# Patient Record
Sex: MALE | Employment: OTHER | URBAN - METROPOLITAN AREA
[De-identification: names, ages, dates, MRNs, and addresses within clinical notes are randomized per-mention and may not be internally consistent; named-entity substitution may affect disease eponyms.]

---

## 2020-08-28 ENCOUNTER — ANCILLARY PROCEDURE (OUTPATIENT)
Dept: GENERAL RADIOLOGY | Facility: CLINIC | Age: 45
End: 2020-08-28
Attending: NURSE PRACTITIONER
Payer: COMMERCIAL

## 2020-08-28 ENCOUNTER — OFFICE VISIT (OUTPATIENT)
Dept: FAMILY MEDICINE | Facility: CLINIC | Age: 45
End: 2020-08-28
Payer: COMMERCIAL

## 2020-08-28 VITALS
BODY MASS INDEX: 26.88 KG/M2 | WEIGHT: 192 LBS | SYSTOLIC BLOOD PRESSURE: 137 MMHG | HEART RATE: 72 BPM | HEIGHT: 71 IN | DIASTOLIC BLOOD PRESSURE: 88 MMHG | TEMPERATURE: 98.1 F | OXYGEN SATURATION: 99 %

## 2020-08-28 DIAGNOSIS — S49.92XA SHOULDER INJURY, LEFT, INITIAL ENCOUNTER: ICD-10-CM

## 2020-08-28 DIAGNOSIS — S49.92XA SHOULDER INJURY, LEFT, INITIAL ENCOUNTER: Primary | ICD-10-CM

## 2020-08-28 PROCEDURE — 73030 X-RAY EXAM OF SHOULDER: CPT | Mod: LT

## 2020-08-28 PROCEDURE — 99203 OFFICE O/P NEW LOW 30 MIN: CPT | Performed by: NURSE PRACTITIONER

## 2020-08-28 RX ORDER — CYCLOBENZAPRINE HCL 10 MG
TABLET ORAL
Qty: 20 TABLET | Refills: 0 | Status: SHIPPED | OUTPATIENT
Start: 2020-08-28

## 2020-08-28 RX ORDER — NAPROXEN 500 MG/1
500 TABLET ORAL 2 TIMES DAILY WITH MEALS
Qty: 14 TABLET | Refills: 0 | Status: SHIPPED | OUTPATIENT
Start: 2020-08-28 | End: 2020-09-04

## 2020-08-28 ASSESSMENT — MIFFLIN-ST. JEOR: SCORE: 1768.04

## 2020-08-28 NOTE — PROGRESS NOTES
"Subjective   Nigel Pro is a 45 year old male who presents to clinic today for the following health issues:    HPI   Musculoskeletal problem/pain  Onset/Duration: 07/03/2020 -   Description  Location: Shoulder - left  Joint Swelling: no  Redness: no  Pain: YES - discomfort when sleeping  Warmth: no  Intensity:  mild, moderate  Progression of Symptoms:  same  Accompanying signs and symptoms:   Fevers: no  Numbness/tingling/weakness: Did stop lifting weight due to the pain - 3-4x per week  History  Trauma to the area: Remembers day it stared hurting - boat was twisted around - had to jump off and move it July 3rd.   Recent illness:  no  Previous similar problem: YES- [id number] injured wake boarding  Previous evaluation:  YES- for injury above had an MRI and PT - did get completely better  Precipitating or alleviating factors:  Aggravating factors include: exercise and overuse  Therapies tried and outcome: heat, ice when first started hurting and NSAID - ibuprofen at bedtime 2 tablets      Recalls MRI revealed slight labrum tear 2007 or 2008.     Reviewed and updated as needed this visit by provider:  Tobacco  Allergies  Meds  Problems  Med Hx  Surg Hx  Fam Hx         Review of Systems   Constitutional, HEENT, cardiovascular, pulmonary, GI, , musculoskeletal, neuro, skin, endocrine and psych systems are negative, except as otherwise noted in the HPI.          Objective   /88 (BP Location: Right arm, Patient Position: Chair, Cuff Size: Adult Large)   Pulse 72   Temp 98.1  F (36.7  C) (Tympanic)   Ht 1.803 m (5' 11\")   Wt 87.1 kg (192 lb)   SpO2 99%   BMI 26.78 kg/m   Body mass index is 26.78 kg/m .  Physical Exam   GENERAL: healthy, alert, well nourished, well hydrated, no distress  RESP: lungs clear to auscultation - no rales, no rhonchi, no wheezes  CV: regular rates and rhythm, normal S1 S2, no S3 or S4 and no murmur, no click or rub -  ABDOMEN: soft, no tenderness, no  hepatosplenomegaly, no " masses, normal bowel sounds  SHOULDER Exam-Left   Inspection: no swelling, no bruising, no discoloration, no obvious deformity, no asymmetry, no glenohumeral joint anterior bulge, no distal clavicle elevation, no muscle atrophy, no scapular winging   Tenderness of: SC joint- no, clavicle(prox-mid)- no, clavicle-(mid-distal)- no, AC joint- no, acromion- no, anterior capsule- no, prox bicep tendon- no, greater tuberosity- no, prox humerus- no, supraspinatous- no, infraspinatous- YES, superior trapezious- no, rhomboids- no   Range of Motion: Active- forward flexion- full but painful at about 135 degrees, abduction- painful at about 90 degress , external rotation- normal, internal rotation- normal    Strength: forward flexion- 5/5, abduction- 5/5, internal rotation- 5/5, external rotation- 5/5 and bicep- full      SHOULDER Exam-Right WNL                       SKIN: no suspicious lesions, no rashes    Diagnostic Test Results  Xray - unremarkable awaiting official radiology read      Assessment & Plan   Nigel was seen today for new patient and musculoskeletal problem.    Diagnoses and all orders for this visit:    Shoulder injury, left, initial encounter  Pain X 7 weeks not improved back to his baseline.   Previous left shoulder injury.   Muscle relaxer at bedtime; naproxen BID.  Referral to shoulder specialist.  Red flag symptoms discussed and if these occur present to the emergency room or call 911.  Nigel verbalizes understanding of plan of care and is in agreement.    -     XR Shoulder Left G/E 3 Views; Future  -     cyclobenzaprine (FLEXERIL) 10 MG tablet; 1/2 tab during the day and 1 tab at bedtime as needed for muscle spasms  -     naproxen (NAPROSYN) 500 MG tablet; Take 1 tablet (500 mg) by mouth 2 times daily (with meals) for 7 days  -     Orthopedic & Spine  Referral; Future    See Patient Instructions    Return in about 2 weeks (around 9/11/2020), or if symptoms worsen or fail to improve.     Melissa  Burke FNP-BC     [address] [address]  [email or web address]   Office: [phone]

## 2020-08-28 NOTE — PATIENT INSTRUCTIONS
Patient Education     Shoulder Pain with Uncertain Cause  Shoulder pain can have many causes. Pain often comes from the structures that surround the shoulder joint. These are the joint capsule, ligaments, tendons, muscles, and bursa. Pain can also come from cartilage in the joint. Cartilage can become worn out or injured. It s important to know what s causing your pain so the healthcare provider can use the correct treatment. But sometimes it s difficult to find the exact cause of shoulder pain. You may need to see a specialist (orthopedist). You may also need special tests such as a CT scan or MRI. The provider may need to use special tools to look inside the joint (arthroscopy).  Shoulder pain can be treated with a sling or a device that keeps your shoulder from moving. You can take an anti-inflammatory medicine such as ibuprofen to ease pain. You may need to do special shoulder exercises. Follow up with a specialist if the pain is severe or doesn t go away after a few weeks.  Home care  Follow these tips when caring for yourself at home:    If a sling was given to you, leave it in place for the time advised by your healthcare provider. If you aren t sure how long to wear it, ask for advice. If the sling becomes loose, adjust it so that your forearm is level with the ground. Your shoulder should feel well supported.    Put an ice pack on the injured area for 20 minutes every 1 to 2 hours the first day. You can make your own ice pack by putting ice cubes in a plastic bag. Wrap the bag in a thin towel. Continue with ice packs 3 to 4 times a day for the next 2 days. Then use the pack as needed to ease pain and swelling.    You may use acetaminophen or ibuprofen to control pain, unless another pain medicine was prescribed. If you have chronic liver or kidney disease, talk with your healthcare provider before using these medicines. Also talk with your provider if you ve ever had a stomach ulcer or GI  bleeding.    Shoulder pain may seem worse at night, when there is less to distract you from the pain. If you sleep on your side, try to keep weight off your painful shoulder. Propping pillows behind you may stop you from rolling over onto that shoulder during sleep.     Shoulder and elbow joints can become stiff if left in a sling for too long. You should start range of motion exercises about 7 to 10 days after the injury. Talk with your provider to find out what type of exercises to do and how soon to start.    You can take the sling off to shower or bathe.  Follow-up care  Follow up with your healthcare provider if you don t start to get better in the next 5 days.  When to seek medical advice  Call your healthcare provider right away if any of these occur:    Pain or swelling gets worse or continues for more than a few days    Your hand or fingers become cold, blue, numb, or tingly    Large amount of bruising on your shoulder or upper arm    Difficulty moving your hand or fingers    Weakness in your hand or fingers    Your shoulder becomes stiff    It feels like your shoulder is popping out    You are less able to do your daily activities  Date Last Reviewed: 10/1/2016    0999-1413 [employer]. [address] [place] [address], [place] [address]. All rights reserved. This information is not intended as a substitute for professional medical care. Always follow your healthcare professional's instructions.

## 2020-08-28 NOTE — LETTER
[facility]  [address] [place] [address]  [phone]                    August 31, 2020    Nigel Pro  [address]  [place] [address]      Dear Nigel,    Here is a summary of your recent test results:    Nigel,     It was nice to meet you.       Your shoulder xray was normal. Please see shoulder specialist as discussed to evaluate your shoulder pain further.       VERONICA Huston     For additional lab test information, [email or web address] is an excellent reference.     Your test results are enclosed.      Please contact me if you have any questions.    In addition, here is a list of due or overdue Health Maintenance reminders.    Health Maintenance Due   Topic Date Due     Colorectal Cancer Screening  07/24/1983     HIV Screening  07/24/1988     Preventive Care Visit  09/14/2017     Cholesterol Lab  09/14/2017     Prostate Test  09/14/2017     PHQ-2  01/01/2020     Flu Vaccine (1) 09/01/2020       Please call us at [phone] (or use [employer]) to address the above recommendations.            Thank you very much for trusting [facility]..     Healthy regards,      VERONICA Huston        Results for orders placed or performed in visit on 08/28/20   XR Shoulder Left G/E 3 Views     Status: None    Narrative    XR SHOULDER LT G/E 3 VW   8/28/2020 4:00 PM     HISTORY:  Shoulder injury, left, initial encounter      Impression    IMPRESSION:  Negative exam.    JUVENTINO TALAMANTES MD

## 2020-08-30 NOTE — RESULT ENCOUNTER NOTE
Note to Staff: please send a result letter    Nigel,    It was nice to meet you.     Your shoulder xray was normal. Please see shoulder specialist as discussed to evaluate your shoulder pain further.       NATALIYA Huston-BC    For additional lab test information, [email or web address] is an excellent reference.

## 2020-09-01 ENCOUNTER — OFFICE VISIT (OUTPATIENT)
Dept: ORTHOPEDICS | Facility: CLINIC | Age: 45
End: 2020-09-01
Attending: NURSE PRACTITIONER
Payer: COMMERCIAL

## 2020-09-01 ENCOUNTER — ANCILLARY PROCEDURE (OUTPATIENT)
Dept: GENERAL RADIOLOGY | Facility: CLINIC | Age: 45
End: 2020-09-01
Attending: ORTHOPAEDIC SURGERY
Payer: COMMERCIAL

## 2020-09-01 VITALS
BODY MASS INDEX: 26.88 KG/M2 | WEIGHT: 192 LBS | HEIGHT: 71 IN | DIASTOLIC BLOOD PRESSURE: 98 MMHG | SYSTOLIC BLOOD PRESSURE: 140 MMHG

## 2020-09-01 DIAGNOSIS — S43.432A SUPERIOR LABRUM ANTERIOR-TO-POSTERIOR (SLAP) TEAR OF LEFT SHOULDER: Primary | ICD-10-CM

## 2020-09-01 DIAGNOSIS — S49.92XA SHOULDER INJURY, LEFT, INITIAL ENCOUNTER: ICD-10-CM

## 2020-09-01 PROCEDURE — 73030 X-RAY EXAM OF SHOULDER: CPT | Mod: LT

## 2020-09-01 PROCEDURE — 99204 OFFICE O/P NEW MOD 45 MIN: CPT | Performed by: ORTHOPAEDIC SURGERY

## 2020-09-01 ASSESSMENT — MIFFLIN-ST. JEOR: SCORE: 1768.04

## 2020-09-01 NOTE — PATIENT INSTRUCTIONS
1. Superior labrum anterior-to-posterior (SLAP) tear of left shoulder    2. Shoulder injury, left, initial encounter      3T MRI ordered   [place] [place] [phone]    Follow up with Dr. Jurado once MRI is complete    Call my office with any questions or concerns, [phone].

## 2020-09-01 NOTE — LETTER
"    9/1/2020         RE: Nigel Pro  [address]  [place] [address]        Dear Colleague,    Thank you for referring your patient, Nigel Pro, to the [facility] ORTHOPEDIC SURGERY. Please see a copy of my visit note below.    CHIEF COMPLAINT: Left shoulder pain    DIAGNOSIS: Left shoulder pain concerning for posterosuperior labral tear, mild scapular dyskinesis    OCCUPATION/SPORT: , wake surfing    HPI:   Nigel Pro is a 45 year old, right-hand dominant male who presents for evaluation of left shoulder pain. Symptoms started on the 3rd of July. There was a precipitating event where he was pushing his boat off the dock and noted discomfort and soreness in his left posterior shoulder. The pain is located to the left posterosuperior shoulder. Worst pain is rated a 5 of 10, and current pain is rated at 1 of 10. He thought that the symptoms would go away on his own. Symptoms are worsened by bench press, taking of his shirt - crossing his arms at his waist and coming up, sleeping. The pain in his shoulder does wake him from sleep at night. Symptoms are improved with activity avoidance, rest, Naproxen and muscle relaxers. Patient has tried heat and ice with minimal relief. Associated symptoms include sharp pain, popping and catching. Notably, the patient has had a previous injury about 15 years ago while wakeboarding, had an MRI and showed he had a \"slight\" labral tear that was treated with four sessions of PT and it went away on its own. He didn't have any problems until the July 3rd incident No other concerns or complaints at this time.    PAST MEDICAL HISTORY:  1. None    CURRENT MEDICATIONS:  1. Flexeril PRN  2. Naproxen PRN    ALLERGIES:   NKDA    PAST SURGICAL HISTORY:  1. None    FAMILY HISTORY: No known family history of bleeding, clotting, or anesthesia related complications.    SOCIAL HISTORY: Patient is  and lives at home with his wife and two children. He works " "as an  which is sedentary work. He enjoys wakesurfing and riding bicycle. No other sports or dedicated athletics.    TOXIC HABITS:  I spoke with Nigel today regarding tobacco use and they informed me that they do not use any tobacco products.    REVIEW OF SYSTEMS:  General: Denies fevers, chills, or night sweats.  Skin: Denies rashes or lesions.  Head: Denies headache or dizziness.  Eyes: Denies vision changes or eye pain.  Ears: Denies ear pain or decreased hearing.  Nose: Denies nose bleeding or sinus pain.  Mouth & Throat: Denies bleeding gums or sore throat.  Neck: Denies neck pain or stiffness.  Respiratory: Denies cough, wheezing, sob, or hemoptysis.  Cardiovascular: Denies chest pain, chest pressure, or palpitations.   Gastrointestinal: Denies abdominal pain, nausea, vomiting, diarrhea, or constipation.  Genitourinary: Denies difficulty or pain with urination.   Musculoskeletal: As noted above in the HPI, otherwise normal.  Neuro: Denies paralysis, weakness, paresthesias, or speech difficulty.  Lymphatics: Denies lymphadenopathy.  Psych: Denies anxiety, sadness, or irritability.    PHYSICAL EXAM:  Patient is 5' 11\" and weighs 192 lbs 0 oz. Ht 1.803 m (5' 11\")   Wt 87.1 kg (192 lb)   BMI 26.78 kg/m    Constitutional: Well-developed, well-nourished, healthy appearing male.  Skin: Warm, dry, and without rashes.   HEENT: Normocephalic, atraumatic. Extraocular movements intact. Moist mucous membranes.  Cardiac: Well perfused extremities, strong 2+ peripheral pulses. No edema.   Pulmonary: Non-labored respirations on room air without audible wheezes.   Abdomen: Soft, nontender.  Musculoskeletal: Patient ambulates with a slow, symmetric, and steady gait. Active range of motion of the left shoulder is 170/85/60/T8 compared to 170/85/60/T8 on the right. Left shoulder has a positive active compression test, modified dynamic labral shear test, Katie and jerk test.  All of these were negative on the right.  " Supine exam demonstrates 1+ anterior load and shift and 1+ posterior load and shift.  1+ sulcus that reduces with external rotation.  When viewed from the back he does demonstrate mild left shoulder scapular dyskinesis.  He has full symmetric cuff strength, but mild deconditioning weakness of external rotation strength at the side. No AC, SC, biceps signs, cross-body adduction, Neer, Concepcion, Paul, scars, atrophy, deformity, belly-press, lift-off, external rotation lag sign, internal rotation lag sign, hornblower's bilaterally. No generalized ligamentous laxity. Neurovascular exam and cervical spine exam are normal.    IMAGING:   Left shoulder 4 view radiographs taken today were personally reviewed by me and demonstrate no acute osseous abnormalities and a normal glenohumeral joint without significant arthrosis, retroversion, or signs of fracture or dislocation.    IMPRESSION: 45 year old-year-old right hand dominant male with left shoulder pain and exam findings concerning for a posterosuperior labral tear.     PLAN:   I had a long discussion with Nigel today.  At the present time his history and exam is most consistent and concerning for a significant posterior superior labral tear.  He also has an element of scapular dyskinesis.  Both of these entities can be addressed with a course of nonoperative management, but given the length of his symptoms and severity with his frequent night pain, I do recommend obtaining an MRI scan of the left shoulder.  I do not recommend an arthrogram, instead I would prefer a left shoulder 3 Henrietta MRI scan to carefully evaluate the posterior and superior labrum.  This MRI was ordered.  We will schedule it at his earliest convenience.    In the meantime, I would like him to start working on some basic thoracic extension exercises and lower trapezius strengthening exercises that I demonstrated for him and explained to him.  He should try and perform these exercises at least once every  other day.  He should treat his symptoms with activity modification, relative rest, icing, and nonsteroidal anti-inflammatory drugs.    Return to clinic following the left shoulder MRI for discussion of next steps.  He may be a candidate for an intra-articular corticosteroid injection followed by physical therapy depending on what the MRI shows.    At the conclusion of the office visit, Nigel verbally acknowledged that I answered all of his questions satisfactorily.    Again, thank you for allowing me to participate in the care of your patient.        Sincerely,        Anjel Jurado MD

## 2020-09-01 NOTE — PROGRESS NOTES
"CHIEF COMPLAINT: Left shoulder pain    DIAGNOSIS: Left shoulder pain concerning for posterosuperior labral tear, mild scapular dyskinesis    OCCUPATION/SPORT: , wake surfing    HPI:   Nigel Pro is a 45 year old, right-hand dominant male who presents for evaluation of left shoulder pain. Symptoms started on the 3rd of July. There was a precipitating event where he was pushing his boat off the dock and noted discomfort and soreness in his left posterior shoulder. The pain is located to the left posterosuperior shoulder. Worst pain is rated a 5 of 10, and current pain is rated at 1 of 10. He thought that the symptoms would go away on his own. Symptoms are worsened by bench press, taking of his shirt - crossing his arms at his waist and coming up, sleeping. The pain in his shoulder does wake him from sleep at night. Symptoms are improved with activity avoidance, rest, Naproxen and muscle relaxers. Patient has tried heat and ice with minimal relief. Associated symptoms include sharp pain, popping and catching. Notably, the patient has had a previous injury about 15 years ago while wakeboarding, had an MRI and showed he had a \"slight\" labral tear that was treated with four sessions of PT and it went away on its own. He didn't have any problems until the July 3rd incident No other concerns or complaints at this time.    PAST MEDICAL HISTORY:  1. None    CURRENT MEDICATIONS:  1. Flexeril PRN  2. Naproxen PRN    ALLERGIES:   NKDA    PAST SURGICAL HISTORY:  1. None    FAMILY HISTORY: No known family history of bleeding, clotting, or anesthesia related complications.    SOCIAL HISTORY: Patient is  and lives at home with his wife and two children. He works as an  which is sedentary work. He enjoys wakesurfing and riding bicycle. No other sports or dedicated athletics.    TOXIC HABITS:  I spoke with Nigel today regarding tobacco use and they informed me that they do not use any tobacco " "products.    REVIEW OF SYSTEMS:  General: Denies fevers, chills, or night sweats.  Skin: Denies rashes or lesions.  Head: Denies headache or dizziness.  Eyes: Denies vision changes or eye pain.  Ears: Denies ear pain or decreased hearing.  Nose: Denies nose bleeding or sinus pain.  Mouth & Throat: Denies bleeding gums or sore throat.  Neck: Denies neck pain or stiffness.  Respiratory: Denies cough, wheezing, sob, or hemoptysis.  Cardiovascular: Denies chest pain, chest pressure, or palpitations.   Gastrointestinal: Denies abdominal pain, nausea, vomiting, diarrhea, or constipation.  Genitourinary: Denies difficulty or pain with urination.   Musculoskeletal: As noted above in the HPI, otherwise normal.  Neuro: Denies paralysis, weakness, paresthesias, or speech difficulty.  Lymphatics: Denies lymphadenopathy.  Psych: Denies anxiety, sadness, or irritability.    PHYSICAL EXAM:  Patient is 5' 11\" and weighs 192 lbs 0 oz. Ht 1.803 m (5' 11\")   Wt 87.1 kg (192 lb)   BMI 26.78 kg/m    Constitutional: Well-developed, well-nourished, healthy appearing male.  Skin: Warm, dry, and without rashes.   HEENT: Normocephalic, atraumatic. Extraocular movements intact. Moist mucous membranes.  Cardiac: Well perfused extremities, strong 2+ peripheral pulses. No edema.   Pulmonary: Non-labored respirations on room air without audible wheezes.   Abdomen: Soft, nontender.  Musculoskeletal: Patient ambulates with a slow, symmetric, and steady gait. Active range of motion of the left shoulder is 170/85/60/T8 compared to 170/85/60/T8 on the right. Left shoulder has a positive active compression test, modified dynamic labral shear test, Katie and jerk test.  All of these were negative on the right.  Supine exam demonstrates 1+ anterior load and shift and 1+ posterior load and shift.  1+ sulcus that reduces with external rotation.  When viewed from the back he does demonstrate mild left shoulder scapular dyskinesis.  He has full symmetric " cuff strength, but mild deconditioning weakness of external rotation strength at the side. No AC, SC, biceps signs, cross-body adduction, Neer, Concepcion, Paul, scars, atrophy, deformity, belly-press, lift-off, external rotation lag sign, internal rotation lag sign, hornblower's bilaterally. No generalized ligamentous laxity. Neurovascular exam and cervical spine exam are normal.    IMAGING:   Left shoulder 4 view radiographs taken today were personally reviewed by me and demonstrate no acute osseous abnormalities and a normal glenohumeral joint without significant arthrosis, retroversion, or signs of fracture or dislocation.    IMPRESSION: 45 year old-year-old right hand dominant male with left shoulder pain and exam findings concerning for a posterosuperior labral tear.     PLAN:   I had a long discussion with Nigel today.  At the present time his history and exam is most consistent and concerning for a significant posterior superior labral tear.  He also has an element of scapular dyskinesis.  Both of these entities can be addressed with a course of nonoperative management, but given the length of his symptoms and severity with his frequent night pain, I do recommend obtaining an MRI scan of the left shoulder.  I do not recommend an arthrogram, instead I would prefer a left shoulder 3 Henrietta MRI scan to carefully evaluate the posterior and superior labrum.  This MRI was ordered.  We will schedule it at his earliest convenience.    In the meantime, I would like him to start working on some basic thoracic extension exercises and lower trapezius strengthening exercises that I demonstrated for him and explained to him.  He should try and perform these exercises at least once every other day.  He should treat his symptoms with activity modification, relative rest, icing, and nonsteroidal anti-inflammatory drugs.    Return to clinic following the left shoulder MRI for discussion of next steps.  He may be a candidate for  an intra-articular corticosteroid injection followed by physical therapy depending on what the MRI shows.    At the conclusion of the office visit, Nigel verbally acknowledged that I answered all of his questions satisfactorily.

## 2020-09-03 ENCOUNTER — TRANSFERRED RECORDS (OUTPATIENT)
Dept: HEALTH INFORMATION MANAGEMENT | Facility: CLINIC | Age: 45
End: 2020-09-03

## 2020-09-15 ENCOUNTER — OFFICE VISIT (OUTPATIENT)
Dept: ORTHOPEDICS | Facility: CLINIC | Age: 45
End: 2020-09-15
Payer: COMMERCIAL

## 2020-09-15 VITALS
WEIGHT: 192 LBS | HEIGHT: 71 IN | SYSTOLIC BLOOD PRESSURE: 122 MMHG | BODY MASS INDEX: 26.88 KG/M2 | DIASTOLIC BLOOD PRESSURE: 83 MMHG

## 2020-09-15 DIAGNOSIS — S43.432A SUPERIOR LABRUM ANTERIOR-TO-POSTERIOR (SLAP) TEAR OF LEFT SHOULDER: Primary | ICD-10-CM

## 2020-09-15 PROCEDURE — 20611 DRAIN/INJ JOINT/BURSA W/US: CPT | Mod: LT | Performed by: ORTHOPAEDIC SURGERY

## 2020-09-15 PROCEDURE — 99213 OFFICE O/P EST LOW 20 MIN: CPT | Mod: 25 | Performed by: ORTHOPAEDIC SURGERY

## 2020-09-15 RX ORDER — METHYLPREDNISOLONE ACETATE 40 MG/ML
80 INJECTION, SUSPENSION INTRA-ARTICULAR; INTRALESIONAL; INTRAMUSCULAR; SOFT TISSUE
Status: SHIPPED | OUTPATIENT
Start: 2020-09-15

## 2020-09-15 RX ORDER — LIDOCAINE HYDROCHLORIDE 10 MG/ML
5 INJECTION, SOLUTION INFILTRATION; PERINEURAL
Status: SHIPPED | OUTPATIENT
Start: 2020-09-15

## 2020-09-15 RX ADMIN — LIDOCAINE HYDROCHLORIDE 5 ML: 10 INJECTION, SOLUTION INFILTRATION; PERINEURAL at 09:25

## 2020-09-15 RX ADMIN — METHYLPREDNISOLONE ACETATE 80 MG: 40 INJECTION, SUSPENSION INTRA-ARTICULAR; INTRALESIONAL; INTRAMUSCULAR; SOFT TISSUE at 09:25

## 2020-09-15 ASSESSMENT — MIFFLIN-ST. JEOR: SCORE: 1768.04

## 2020-09-15 NOTE — PATIENT INSTRUCTIONS
1. Superior labrum anterior-to-posterior (SLAP) tear of left shoulder      Steroid injection of the left shoulder: intra-articular  was performed today in clinic    - Would not soak in a hot tub, bath or swimming pool for 48 hours  - Ok to shower  - Ice today and only do your normal amounts of activity  - The lidocaine (what is giving you pain relief right now) will likely stop working in 1-2 hours.  You will then have pain again, similar to before you received the injection. The corticosteroid will not start working until approximately 1-2 weeks from now.  In a small percentage of people, cortisone can cause flushing/redness in the face. This usually lasts for 1-3 days and resolves. Cool compress and Ibuprofen/Tylenol can help if this happens.    Physical Therapy orders have been placed with [facility].  You can call [phone] to schedule at your convenience.   -Please see Rudi Dutta      Follow up with Dr. Jurado as needed    Call my office with any questions or concerns, [phone].

## 2020-09-15 NOTE — PROGRESS NOTES
"CHIEF COMPLAINT: Left shoulder pain    DIAGNOSIS: Left shoulder pain concerning for posterosuperior labral tear, mild scapular dyskinesis    [facility]    HPI:   Nigel Pro is a 45 year old, right-hand dominant male who presents for re evaluation of left shoulder pain. He completed an MRI on 9/3/20 and states there has been no changes to his symptoms. No other concerns or complaints at this time.    REVIEW OF SYSTEMS:  General: Denies fevers, chills, or night sweats.  Skin: Denies rashes or lesions.  Head: Denies headache or dizziness.  Eyes: Denies vision changes or eye pain.  Ears: Denies ear pain or decreased hearing.  Nose: Denies nose bleeding or sinus pain.  Mouth & Throat: Denies bleeding gums or sore throat.  Neck: Denies neck pain or stiffness.  Respiratory: Denies cough, wheezing, sob, or hemoptysis.  Cardiovascular: Denies chest pain, chest pressure, or palpitations.   Gastrointestinal: Denies abdominal pain, nausea, vomiting, diarrhea, or constipation.  Genitourinary: Denies difficulty or pain with urination.   Musculoskeletal: As noted above in the HPI, otherwise normal.  Neuro: Denies paralysis, weakness, paresthesias, or speech difficulty.  Lymphatics: Denies lymphadenopathy.  Psych: Denies anxiety, sadness, or irritability.    PHYSICAL EXAM:  Patient is 5' 11\" and weighs 192 lbs 0 oz. Ht 1.803 m (5' 11\")   Wt 87.1 kg (192 lb)   BMI 26.78 kg/m    Constitutional: Well-developed, well-nourished, healthy appearing male.  Skin: Warm, dry, and without rashes.   HEENT: Normocephalic, atraumatic. Extraocular movements intact. Moist mucous membranes.  Cardiac: Well perfused extremities, strong 2+ peripheral pulses. No edema.   Pulmonary: Non-labored respirations on room air without audible wheezes.   Abdomen: Soft, nontender.  Musculoskeletal: Patient ambulates with a slow, symmetric, and steady gait. Active range of motion of the left shoulder is 170/85/60/T8 compared " to 170/85/60/T8 on the right. Left shoulder has a positive active compression test, modified dynamic labral shear test, Katie and jerk test.  All of these were negative on the right.  Supine exam demonstrates 1+ anterior load and shift and 1+ posterior load and shift.  1+ sulcus that reduces with external rotation.  When viewed from the back he does demonstrate mild left shoulder scapular dyskinesis.  He has full symmetric cuff strength, but mild deconditioning weakness of external rotation strength at the side. No AC, SC, biceps signs, cross-body adduction, Neer, Concepcion, Paul, scars, atrophy, deformity, belly-press, lift-off, external rotation lag sign, internal rotation lag sign, hornblower's bilaterally. No generalized ligamentous laxity. Neurovascular exam and cervical spine exam are normal.    IMAGING:   Left shoulder MRI dated 9/8/2020 was personally reviewed by me and demonstrates some tendinosis of the supraspinatus tendon insertion with some partial-thickness intrasubstance tearing, but no full-thickness tear.  Small amount of subacromial bursitis with minimal AC joint degenerative change.  There is a posterior superior labral tear but no para labral cyst.    IMPRESSION: 45 year old-year-old right hand dominant male with history, exam, and imaging findings consistent with a left shoulder posterior superior labral tear.  He also has some intrasubstance partial-thickness tearing of the supraspinatus tendon with tendinosis, no full thickness tearing.    PLAN:   I had a long discussion with Nigel today.  The MRI confirms my suspicions of a posterior superior labral tear.  He also has the incidental finding of supraspinatus tendinosis and some intrasubstance cuff tearing, however on clinical exam that is not his primary problem.  He is much more bothered by the intra-articular posterior superior labral tear and inflammation.  Given this, I do recommend a course of dedicated physical therapy with a experienced  shoulder therapist.  Referred him to see Dr. Rudi Dutta in [facility] for a comprehensive evaluation, treatment, and home exercise program.  First goal should be to restore pain-free range of motion with posterior capsular flexibility, second goal will be restoration of neuromuscular control and muscular balance with a focus on strengthening the scapular musculature, third goal will be to advance his strengthening incorporate biometrics to increase his muscle endurance, before returning to sport specific retraining.  He may continue to use over-the-counter analgesics such as anti-inflammatories as needed for adjunct symptoms.    Lastly I did discuss with him a intra-articular corticosteroid injection to help cut down his pain symptoms, specifically his nighttime symptoms.  Risks, benefits, and alternatives to a steroid injection were discussed and he wished to proceed.  Signed informed consent was obtained.  Patient tolerated the injection well and noted immediate improvement in his pain symptoms.    He should return to see me on an as-needed basis moving forward.  Injections were repeated up to every 3 months.  If he does not improve though, I would consider possible surgery for a left shoulder arthroscopic posterior superior labral repair.    At the conclusion of the office visit, Nigel verbally acknowledged that I answered all of his questions satisfactorily.     Large Joint Injection/Arthocentesis: L glenohumeral joint    Date/Time: 9/15/2020 9:25 AM  Performed by: Anjel Jurado MD  Authorized by: Anjel Jurado MD     Indications:  Pain  Needle Size:  22 G  Guidance: ultrasound    Approach:  Posterior  Location:  Shoulder      Site:  L glenohumeral joint  Medications:  80 mg methylPREDNISolone 40 MG/ML; 5 mL lidocaine 1 %  Medications comment:  8 mL's of Lidocaine were administered  Outcome:  Tolerated well, no immediate complications  Procedure discussed: discussed risks, benefits,  and alternatives    Timeout: timeout called immediately prior to procedure    Prep: patient was prepped and draped in usual sterile fashion

## 2020-09-15 NOTE — LETTER
"    9/15/2020         RE: Nigel Pro  [address]  [place] [address]        Dear Colleague,    Thank you for referring your patient, Nigel Pro, to the [facility]. Please see a copy of my visit note below.    CHIEF COMPLAINT: Left shoulder pain    DIAGNOSIS: Left shoulder pain concerning for posterosuperior labral tear, mild scapular dyskinesis    OCCUPATION/SPORT: , [employer]    HPI:   Nigel Pro is a 45 year old, right-hand dominant male who presents for re evaluation of left shoulder pain. He completed an MRI on 9/3/20 and states there has been no changes to his symptoms. No other concerns or complaints at this time.    REVIEW OF SYSTEMS:  General: Denies fevers, chills, or night sweats.  Skin: Denies rashes or lesions.  Head: Denies headache or dizziness.  Eyes: Denies vision changes or eye pain.  Ears: Denies ear pain or decreased hearing.  Nose: Denies nose bleeding or sinus pain.  Mouth & Throat: Denies bleeding gums or sore throat.  Neck: Denies neck pain or stiffness.  Respiratory: Denies cough, wheezing, sob, or hemoptysis.  Cardiovascular: Denies chest pain, chest pressure, or palpitations.   Gastrointestinal: Denies abdominal pain, nausea, vomiting, diarrhea, or constipation.  Genitourinary: Denies difficulty or pain with urination.   Musculoskeletal: As noted above in the HPI, otherwise normal.  Neuro: Denies paralysis, weakness, paresthesias, or speech difficulty.  Lymphatics: Denies lymphadenopathy.  Psych: Denies anxiety, sadness, or irritability.    PHYSICAL EXAM:  Patient is 5' 11\" and weighs 192 lbs 0 oz. Ht 1.803 m (5' 11\")   Wt 87.1 kg (192 lb)   BMI 26.78 kg/m    Constitutional: Well-developed, well-nourished, healthy appearing male.  Skin: Warm, dry, and without rashes.   HEENT: Normocephalic, atraumatic. Extraocular movements intact. Moist mucous membranes.  Cardiac: Well perfused extremities, strong 2+ peripheral pulses. No " edema.   Pulmonary: Non-labored respirations on room air without audible wheezes.   Abdomen: Soft, nontender.  Musculoskeletal: Patient ambulates with a slow, symmetric, and steady gait. Active range of motion of the left shoulder is 170/85/60/T8 compared to 170/85/60/T8 on the right. Left shoulder has a positive active compression test, modified dynamic labral shear test, Katie and jerk test.  All of these were negative on the right.  Supine exam demonstrates 1+ anterior load and shift and 1+ posterior load and shift.  1+ sulcus that reduces with external rotation.  When viewed from the back he does demonstrate mild left shoulder scapular dyskinesis.  He has full symmetric cuff strength, but mild deconditioning weakness of external rotation strength at the side. No AC, SC, biceps signs, cross-body adduction, Neer, Concepcion, Paul, scars, atrophy, deformity, belly-press, lift-off, external rotation lag sign, internal rotation lag sign, hornblower's bilaterally. No generalized ligamentous laxity. Neurovascular exam and cervical spine exam are normal.    IMAGING:   Left shoulder MRI dated 9/8/2020 was personally reviewed by me and demonstrates some tendinosis of the supraspinatus tendon insertion with some partial-thickness intrasubstance tearing, but no full-thickness tear.  Small amount of subacromial bursitis with minimal AC joint degenerative change.  There is a posterior superior labral tear but no para labral cyst.    IMPRESSION: 45 year old-year-old right hand dominant male with history, exam, and imaging findings consistent with a left shoulder posterior superior labral tear.  He also has some intrasubstance partial-thickness tearing of the supraspinatus tendon with tendinosis, no full thickness tearing.    PLAN:   I had a long discussion with Nigel today.  The MRI confirms my suspicions of a posterior superior labral tear.  He also has the incidental finding of supraspinatus tendinosis and some intrasubstance  cuff tearing, however on clinical exam that is not his primary problem.  He is much more bothered by the intra-articular posterior superior labral tear and inflammation.  Given this, I do recommend a course of dedicated physical therapy with a experienced shoulder therapist.  Referred him to see Dr. Rudi Dutta in [facility] for a comprehensive evaluation, treatment, and home exercise program.  First goal should be to restore pain-free range of motion with posterior capsular flexibility, second goal will be restoration of neuromuscular control and muscular balance with a focus on strengthening the scapular musculature, third goal will be to advance his strengthening incorporate biometrics to increase his muscle endurance, before returning to sport specific retraining.  He may continue to use over-the-counter analgesics such as anti-inflammatories as needed for adjunct symptoms.    Lastly I did discuss with him a intra-articular corticosteroid injection to help cut down his pain symptoms, specifically his nighttime symptoms.  Risks, benefits, and alternatives to a steroid injection were discussed and he wished to proceed.  Signed informed consent was obtained.  Patient tolerated the injection well and noted immediate improvement in his pain symptoms.    He should return to see me on an as-needed basis moving forward.  Injections were repeated up to every 3 months.  If he does not improve though, I would consider possible surgery for a left shoulder arthroscopic posterior superior labral repair.    At the conclusion of the office visit, Nigel verbally acknowledged that I answered all of his questions satisfactorily.     Large Joint Injection/Arthocentesis: L glenohumeral joint    Date/Time: 9/15/2020 9:25 AM  Performed by: Anjel Jurado MD  Authorized by: Anjel Jurado MD     Indications:  Pain  Needle Size:  22 G  Guidance: ultrasound    Approach:  Posterior  Location:  Shoulder      Site:   L glenohumeral joint  Medications:  80 mg methylPREDNISolone 40 MG/ML; 5 mL lidocaine 1 %  Medications comment:  8 mL's of Lidocaine were administered  Outcome:  Tolerated well, no immediate complications  Procedure discussed: discussed risks, benefits, and alternatives    Timeout: timeout called immediately prior to procedure    Prep: patient was prepped and draped in usual sterile fashion            Again, thank you for allowing me to participate in the care of your patient.        Sincerely,        Anjel Jurado MD

## 2020-09-18 ENCOUNTER — THERAPY VISIT (OUTPATIENT)
Dept: PHYSICAL THERAPY | Facility: CLINIC | Age: 45
End: 2020-09-18
Attending: ORTHOPAEDIC SURGERY
Payer: COMMERCIAL

## 2020-09-18 DIAGNOSIS — S43.432A SUPERIOR LABRUM ANTERIOR-TO-POSTERIOR (SLAP) TEAR OF LEFT SHOULDER: ICD-10-CM

## 2020-09-18 DIAGNOSIS — M75.112 NONTRAUMATIC INCOMPLETE TEAR OF LEFT ROTATOR CUFF: ICD-10-CM

## 2020-09-18 DIAGNOSIS — S43.432A TEAR OF LEFT GLENOID LABRUM: ICD-10-CM

## 2020-09-18 PROCEDURE — 97110 THERAPEUTIC EXERCISES: CPT | Mod: GP

## 2020-09-18 PROCEDURE — 97161 PT EVAL LOW COMPLEX 20 MIN: CPT | Mod: GP

## 2020-09-18 PROCEDURE — 97112 NEUROMUSCULAR REEDUCATION: CPT | Mod: GP

## 2020-09-18 NOTE — PROGRESS NOTES
[facility] Initial Evaluation  Subjective:    Therapist Generated HPI Evaluation  Problem details: Onset: 7/3/20 but was an old injury; reaching and working out was tough -   Had injection 9/15/20 and better since then.  Sleeping was bad but not so much anymore. Today not much pain; Pt is LHD; .         Type of problem:  Left shoulder.    This is a new condition.  Condition occurred with:  Unknown cause.  Where condition occurred: other.  Patient reports pain:  In the joint.  and is intermittent.    Since onset symptoms are rapidly improving.  Associated symptoms:  Loss of strength and loss of motion/stiffness. Symptoms are exacerbated by certain positions    Special tests included:  MRI.                            Objective:  Standing Alignment:    Cervical/Thoracic:  Forward head  Shoulder/UE:  Rounded shoulders                                       Shoulder Evaluation:  ROM:  AROM:  : Very guarded ROM -   Flexion:  Left:  150        Abduction:  Left: 120       Internal Rotation:  Left:  10 (painful)      External Rotation:  Left:  20 (painful)                          Strength:    Flexion: Left:4+/5   Pain:        Abduction:  Left: 4/5  Pain:        Internal Rotation:  Left:5-/5     Pain:      External Rotation:   Left:4+/5     Pain:                 Palpation:  normal      Mobility Tests:  Mobility wnl shoulder: Scapular dyskinesis.                                                 General     ROS    Assessment/Plan:    Patient is a 45 year old male with left side shoulder complaints.    Patient has the following significant findings with corresponding treatment plan.                Diagnosis 1:  Left shoulder posterosuperior labral tear and partial thickness supraspinatus tendon tear.   Decreased ROM/flexibility - manual therapy and therapeutic exercise  Decreased strength - therapeutic exercise and therapeutic activities  Impaired muscle performance - neuro re-education  Decreased function -  therapeutic activities    Therapy Evaluation Codes:       Previous and current functional limitations:  (See Goal Flow Sheet for this information)    Short term and Long term goals: (See Goal Flow Sheet for this information)     Communication ability:  Patient appears to be able to clearly communicate and understand verbal and written communication and follow directions correctly.  Treatment Explanation - The following has been discussed with the patient:   RX ordered/plan of care  Anticipated outcomes  Possible risks and side effects  This patient would benefit from PT intervention to resume normal activities.   Rehab potential is good.    Frequency:  1 X week, once daily  Duration:  for 6-8 weeks  Discharge Plan:  Achieve all LTG.  Independent in home treatment program.  Reach maximal therapeutic benefit.    Please refer to the daily flowsheet for treatment today, total treatment time and time spent performing 1:1 timed codes.

## 2020-09-22 ENCOUNTER — TELEPHONE (OUTPATIENT)
Dept: ORTHOPEDICS | Facility: CLINIC | Age: 45
End: 2020-09-22

## 2020-09-22 NOTE — TELEPHONE ENCOUNTER
ORTHOPEDIC SURGEON POST-INJECTION TELEPHONE CALL    DATE OF INJECTION: 9/15/2020    INJECTION PERFORMED: Left shoulder ultrasound guided glenohumeral joint corticosteroid injection    DIAGNOSIS: Left shoulder posterosuperior labral tear, intrasubstance partial-thickness tearing of the supraspinatus tendon with tendinosis, scapular dyskinesis    I called Nigel this morning to check in on him following their injection. Nigel states that he is doing well and notes substantial improvement in his symptoms following the injection. Nigel denies any fevers, chills, or worsening of symptoms. He specifically notes that he is sitting at his desk working right now and has zero pain. He also denies any night time pain at present. No questions or concerns regarding any of the post-injection instructions at this time. He was very pleased with the injection and will continue his therapy and home exercises as prescribed.     At the conclusion of the phone call, Nigle verbally acknowledged that I answered all of his questions satisfactorily.

## 2020-09-25 ENCOUNTER — THERAPY VISIT (OUTPATIENT)
Dept: PHYSICAL THERAPY | Facility: CLINIC | Age: 45
End: 2020-09-25
Payer: COMMERCIAL

## 2020-09-25 DIAGNOSIS — S43.432A TEAR OF LEFT GLENOID LABRUM: ICD-10-CM

## 2020-09-25 DIAGNOSIS — M75.112 NONTRAUMATIC INCOMPLETE TEAR OF LEFT ROTATOR CUFF: ICD-10-CM

## 2020-09-25 PROCEDURE — 97112 NEUROMUSCULAR REEDUCATION: CPT | Mod: GP

## 2020-09-25 PROCEDURE — 97110 THERAPEUTIC EXERCISES: CPT | Mod: GP

## 2020-10-16 ENCOUNTER — THERAPY VISIT (OUTPATIENT)
Dept: PHYSICAL THERAPY | Facility: CLINIC | Age: 45
End: 2020-10-16
Payer: COMMERCIAL

## 2020-10-16 DIAGNOSIS — M75.112 NONTRAUMATIC INCOMPLETE TEAR OF LEFT ROTATOR CUFF: ICD-10-CM

## 2020-10-16 DIAGNOSIS — S43.432A TEAR OF LEFT GLENOID LABRUM: ICD-10-CM

## 2020-10-16 PROCEDURE — 97110 THERAPEUTIC EXERCISES: CPT | Mod: GP

## 2020-10-16 PROCEDURE — 97112 NEUROMUSCULAR REEDUCATION: CPT | Mod: GP

## 2020-12-17 PROBLEM — M75.112 NONTRAUMATIC INCOMPLETE TEAR OF LEFT ROTATOR CUFF: Status: RESOLVED | Noted: 2020-09-18 | Resolved: 2020-12-17

## 2020-12-17 PROBLEM — S43.432A TEAR OF LEFT GLENOID LABRUM: Status: RESOLVED | Noted: 2020-09-18 | Resolved: 2020-12-17

## 2020-12-17 NOTE — PROGRESS NOTES
Discharge Note    Progress reporting period is from initial evaluation date (please see noted date below) to Oct 16, 2020.  Linked Episodes   Type: Episode: Status: Noted: Resolved: Last update: Updated by:   PHYSICAL THERAPY RIP   Active 9/18/2020  10/16/2020  6:57 AM Rudi Dutta, PT      Comments:       Nigel failed to follow up and current status is unknown.  Please see information below for last relevant information on current status.  Patient seen for 3 visits.    SUBJECTIVE  Subjective changes noted by patient:  Overall better - just not completely gone.  Still there at times.    .  Current pain level is  .     Previous pain level was   .   Changes in function:  Yes (See Goal flowsheet attached for changes in current functional level)  Adverse reaction to treatment or activity: None    OBJECTIVE  Changes noted in objective findings: ER=75; IR=50; Flex=155; Abd=125; Noted Click with certain motions - better with scapular setting     ASSESSMENT/PLAN  Diagnosis: L SLAP tear, partial RCT   Updated problem list and treatment plan:   Pain - HEP  STG/LTGs have been met or progress has been made towards goals:  Yes, please see goal flowsheet for most current information  Assessment of Progress: current status is unknown.    Last current status: Pt is progressing slower than anticipated   Self Management Plans:  HEP  I have re-evaluated this patient and find that the nature, scope, duration and intensity of the therapy is appropriate for the medical condition of the patient.  Nigel continues to require the following intervention to meet STG and LTG's:  HEP.    Recommendations:  Discharge with current home program.  Patient to follow up with MD as needed.    Please refer to the daily flowsheet for treatment today, total treatment time and time spent performing 1:1 timed codes.